# Patient Record
Sex: MALE | Race: WHITE | NOT HISPANIC OR LATINO | Employment: UNEMPLOYED | URBAN - METROPOLITAN AREA
[De-identification: names, ages, dates, MRNs, and addresses within clinical notes are randomized per-mention and may not be internally consistent; named-entity substitution may affect disease eponyms.]

---
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
## 2022-09-07 ENCOUNTER — TELEPHONE (OUTPATIENT)
Dept: INTERNAL MEDICINE | Facility: CLINIC | Age: 22
End: 2022-09-07

## 2022-09-07 NOTE — TELEPHONE ENCOUNTER
HUB- please advise that he needs to sign a HYUN form here or at the peds office. We cannot call and request records, a legal release of information form filled out by the patient is required

## 2022-09-07 NOTE — TELEPHONE ENCOUNTER
Caller: MIRANDADAIA    Relationship: Mother    Best call back number: [phone]    What is the best time to reach you: ANY    Who are you requesting to speak with (clinical staff, provider,  specific staff member): CLINICAL    What was the call regarding: PATIENT'S MOTHER CALLED AND STATED THAT SHE WAS TRYING TO OBTAIN PATIENT'S MEDICAL RECORDS FROM HIS PEDIATRIC OFFICE. SHE STATES THAT THEY TOLD HER THAT THE OFFICE COULD REQUEST THESE FROM THEM. THEIR PHONE NUMBER -[phone], AND THIS IS DR. SALLIE CALDWELL ON [facility].     PATIENT WILL BE A NEW PATIENT WITH NATALIA FITZGERALD ON 9/19.    Do you require a callback: YES    PLEASE ADVISE.

## 2022-09-19 ENCOUNTER — OFFICE VISIT (OUTPATIENT)
Dept: INTERNAL MEDICINE | Facility: CLINIC | Age: 22
End: 2022-09-19

## 2022-09-19 VITALS
OXYGEN SATURATION: 99 % | DIASTOLIC BLOOD PRESSURE: 80 MMHG | SYSTOLIC BLOOD PRESSURE: 120 MMHG | HEIGHT: 64 IN | HEART RATE: 81 BPM | TEMPERATURE: 98.9 F | BODY MASS INDEX: 17.86 KG/M2 | WEIGHT: 104.6 LBS

## 2022-09-19 DIAGNOSIS — Q11.1: Chronic | ICD-10-CM

## 2022-09-19 DIAGNOSIS — Q06.8 LOW LYING CONUS MEDULLARIS: Chronic | ICD-10-CM

## 2022-09-19 DIAGNOSIS — G80.1 SPASTIC DIPLEGIC CEREBRAL PALSY: Chronic | ICD-10-CM

## 2022-09-19 DIAGNOSIS — B35.1 ONYCHOMYCOSIS: ICD-10-CM

## 2022-09-19 DIAGNOSIS — Z11.59 SCREENING FOR VIRAL DISEASE: ICD-10-CM

## 2022-09-19 DIAGNOSIS — Z00.00 PHYSICAL EXAM: Primary | ICD-10-CM

## 2022-09-19 PROCEDURE — 99385 PREV VISIT NEW AGE 18-39: CPT | Performed by: NURSE PRACTITIONER

## 2022-09-20 LAB
ALBUMIN SERPL-MCNC: 5 G/DL (ref 4.1–5.2)
ALBUMIN/GLOB SERPL: 2.1 {RATIO} (ref 1.2–2.2)
ALP SERPL-CCNC: 100 IU/L (ref 51–125)
ALT SERPL-CCNC: 19 IU/L (ref 0–44)
AST SERPL-CCNC: 19 IU/L (ref 0–40)
BILIRUB SERPL-MCNC: 0.4 MG/DL (ref 0–1.2)
BUN SERPL-MCNC: 20 MG/DL (ref 6–20)
BUN/CREAT SERPL: 22 (ref 9–20)
CALCIUM SERPL-MCNC: 9.6 MG/DL (ref 8.7–10.2)
CHLORIDE SERPL-SCNC: 102 MMOL/L (ref 96–106)
CHOLEST SERPL-MCNC: 150 MG/DL (ref 100–199)
CO2 SERPL-SCNC: 24 MMOL/L (ref 20–29)
CREAT SERPL-MCNC: 0.9 MG/DL (ref 0.76–1.27)
EGFRCR SERPLBLD CKD-EPI 2021: 125 ML/MIN/1.73
ERYTHROCYTE [DISTWIDTH] IN BLOOD BY AUTOMATED COUNT: 12.6 % (ref 11.6–15.4)
GLOBULIN SER CALC-MCNC: 2.4 G/DL (ref 1.5–4.5)
GLUCOSE SERPL-MCNC: 93 MG/DL (ref 65–99)
GLUCOSE UR QL STRIP: NORMAL
HCT VFR BLD AUTO: 43.8 % (ref 37.5–51)
HDLC SERPL-MCNC: 44 MG/DL
HGB BLD-MCNC: 14.6 G/DL (ref 13–17.7)
KETONES UR QL STRIP: NORMAL
LDLC SERPL CALC-MCNC: 87 MG/DL (ref 0–99)
MCH RBC QN AUTO: 29.3 PG (ref 26.6–33)
MCHC RBC AUTO-ENTMCNC: 33.3 G/DL (ref 31.5–35.7)
MCV RBC AUTO: 88 FL (ref 79–97)
PH UR STRIP: NORMAL [PH]
PLATELET # BLD AUTO: 157 X10E3/UL (ref 150–450)
POTASSIUM SERPL-SCNC: 3.9 MMOL/L (ref 3.5–5.2)
PROT SERPL-MCNC: 7.4 G/DL (ref 6–8.5)
PROT UR QL STRIP: NORMAL
RBC # BLD AUTO: 4.99 X10E6/UL (ref 4.14–5.8)
SODIUM SERPL-SCNC: 141 MMOL/L (ref 134–144)
SP GR UR STRIP: NORMAL
SPECIMEN STATUS: NORMAL
TRIGL SERPL-MCNC: 102 MG/DL (ref 0–149)
TSH SERPL DL<=0.005 MIU/L-ACNC: 1.43 UIU/ML (ref 0.45–4.5)
UNABLE TO VOID: NORMAL
VLDLC SERPL CALC-MCNC: 19 MG/DL (ref 5–40)
WBC # BLD AUTO: 6.6 X10E3/UL (ref 3.4–10.8)

## 2022-09-21 ENCOUNTER — TELEPHONE (OUTPATIENT)
Dept: INTERNAL MEDICINE | Facility: CLINIC | Age: 22
End: 2022-09-21

## 2022-09-21 LAB
HCV AB S/CO SERPL IA: <0.1 S/CO RATIO (ref 0–0.9)
Lab: NORMAL
WRITTEN AUTHORIZATION: NORMAL

## 2022-09-21 NOTE — TELEPHONE ENCOUNTER
Caller: NATALIA JEAN    Relationship to patient: Mother    Best call back number:[phone]    Patient is needing: PATIENTS MOTHER CALLED LOOKING FOR AN UPDATE ABOUT THE MEDICATION FOR HER SONS TOENAILS THAT WAS SUPPOSED TO BE CALLED IN AFTER HIS APPOINTMENT ON 9/19/22.HUB WAS UNABLE TO FIND WHERE ANYTHING WAS CALLED IN.

## 2022-09-23 PROBLEM — Q06.8 LOW LYING CONUS MEDULLARIS: Status: ACTIVE | Noted: 2022-09-23

## 2022-09-23 PROBLEM — Q06.8 LOW LYING CONUS MEDULLARIS: Chronic | Status: ACTIVE | Noted: 2022-09-23

## 2022-09-23 PROBLEM — S59.221A: Status: ACTIVE | Noted: 2017-10-11

## 2022-09-23 PROBLEM — Q11.1: Status: ACTIVE | Noted: 2022-09-23

## 2022-09-23 PROBLEM — B35.1 ONYCHOMYCOSIS: Status: ACTIVE | Noted: 2022-09-23

## 2022-09-23 PROBLEM — Q11.1: Chronic | Status: ACTIVE | Noted: 2022-09-23

## 2022-09-23 NOTE — PROGRESS NOTES
Subjective   Kei Antonio is a 22 y.o. male who is here to establish care and for a physical exam.    History of Present Illness  Patient presents to establish care. Previous medical history discussed with patient in details and reflected in problem list.  He has worked at the [employer] for several years which he enjoys, currently lives at home with mother. He reports feeling well, denies any health problems.  He was diagnosed with CP at age 16, has received ortho evaluations and PT according to previous records.       The following portions of the patient's history were reviewed and updated as appropriate: allergies, current medications, past social history and problem list.    Past Medical History:   Diagnosis Date   • CP (cerebral palsy) (HCC)          Current Outpatient Medications:   •  Efinaconazole 10 % solution, Apply 1 application topically Every Night., Disp: 8 mL, Rfl: 2    No Known Allergies    Review of Systems   Constitutional: Negative for activity change, appetite change, chills, diaphoresis, fatigue, fever and unexpected weight change.   HENT: Positive for congestion and postnasal drip. Negative for dental problem, drooling, ear discharge, ear pain, facial swelling, hearing loss, mouth sores, nosebleeds, rhinorrhea, sinus pressure, sore throat, tinnitus and trouble swallowing.    Eyes: Positive for visual disturbance. Negative for photophobia, pain, discharge, redness and itching.   Respiratory: Negative for apnea, cough, choking, chest tightness, shortness of breath and wheezing.    Cardiovascular: Negative for chest pain, palpitations and leg swelling.        No orthopnea, PND, SHETH   Gastrointestinal: Negative for abdominal pain, blood in stool, constipation, diarrhea, nausea and vomiting.   Endocrine: Negative for cold intolerance, heat intolerance, polydipsia and polyuria.   Genitourinary: Negative for decreased urine volume, dysuria, enuresis, flank pain, frequency, hematuria and urgency.  "  Musculoskeletal: Negative for arthralgias, back pain, gait problem, joint swelling, myalgias, neck pain and neck stiffness.   Skin: Negative for color change and rash.        No hair changes, no nail changes   Allergic/Immunologic: Negative for environmental allergies, food allergies and immunocompromised state.   Neurological: Negative for dizziness, tremors, seizures, syncope, speech difficulty, weakness, light-headedness, numbness and headaches.   Hematological: Negative for adenopathy. Does not bruise/bleed easily.   Psychiatric/Behavioral: Negative for agitation, confusion, decreased concentration, dysphoric mood, sleep disturbance and suicidal ideas. The patient is not nervous/anxious.        Objective   Vitals:    09/19/22 1512   BP: 120/80   BP Location: Left arm   Patient Position: Sitting   Cuff Size: Adult   Pulse: 81   Temp: 98.9 °F (37.2 °C)   TempSrc: Temporal   SpO2: 99%   Weight: 47.4 kg (104 lb 9.6 oz)   Height: 162.6 cm (64\")     Physical Exam  Constitutional:       General: He is not in acute distress.     Appearance: Normal appearance. He is not diaphoretic.   HENT:      Head: Normocephalic and atraumatic.      Right Ear: Tympanic membrane, ear canal and external ear normal.      Left Ear: Tympanic membrane, ear canal and external ear normal.      Nose: Nose normal. No rhinorrhea.      Mouth/Throat:      Mouth: Mucous membranes are moist.      Pharynx: Oropharynx is clear.   Eyes:      General:         Right eye: No discharge.         Left eye: No discharge.      Comments: Right eye prosthesis   Cardiovascular:      Rate and Rhythm: Normal rate and regular rhythm.      Pulses: Normal pulses.      Heart sounds: Normal heart sounds.   Pulmonary:      Effort: Pulmonary effort is normal.      Breath sounds: Normal breath sounds.   Abdominal:      General: Bowel sounds are normal.      Tenderness: There is no abdominal tenderness.   Musculoskeletal:         General: No swelling or tenderness.      " Cervical back: Normal range of motion.   Skin:     General: Skin is warm and dry.      Comments: Thickening and discoloration of toenails bilaterally   Neurological:      General: No focal deficit present.      Mental Status: He is alert and oriented to person, place, and time.      Comments: Walking on toes  Generalized spasticity   Psychiatric:         Mood and Affect: Mood normal.         Behavior: Behavior normal.         Judgment: Judgment normal.         Assessment & Plan   Diagnoses and all orders for this visit:    1. Physical exam (Primary)  -     CBC (No Diff)  -     Comprehensive Metabolic Panel  -     Lipid Panel  -     TSH  -     Urinalysis With Microscopic If Indicated (No Culture) - Urine, Clean Catch    2. Spastic diplegic cerebral palsy (HCC)  Assessment & Plan:  Diagnosed at age 16      3. Low lying conus medullaris (HCC)  Assessment & Plan:  Noted on MRI of lumbar spine in 2016; neurosurgery evaluation 11/23/16 with PT referral      4. Congenital absence of right eye  Assessment & Plan:  Prothesis in place      5. Onychomycosis  Assessment & Plan:  There is thickening and discoloration of bilateral toenails (probably due in part to walking on toes to some degree), will treat and continue to monitor.    Orders:  -     Efinaconazole 10 % solution; Apply 1 application topically Every Night.  Dispense: 8 mL; Refill: 2    6. Screening for viral disease  -     Cancel: Hepatitis C Antibody    Other orders  -     Unable To Void  -     Specimen Status Report  -     Hepatitis C Antibody  -     Please Note  -     Written Authorization      Risk assessment:  He lives at home with his mother, works at the [employer].  His Body mass index is 17.95 kg/m². - He tries to follow a low-fat, low-cholesterol diet.    Prevention:  Health Maintenance   Topic Date Due   • ANNUAL PHYSICAL  Never done   • HPV VACCINES (1 - Male 2-dose series) Never done   • TDAP/TD VACCINES (1 - Tdap) Never done   • COVID-19 Vaccine (4 -  Booster for Moderna series) 01/24/2022   • INFLUENZA VACCINE  10/01/2022   • HEPATITIS C SCREENING  Completed   • Pneumococcal Vaccine 0-64  Aged Out   • MENINGOCOCCAL VACCINE  Aged Out       Discussed healthy lifestyle choices such as maintaining a balanced diet low in carbohydrates and limiting caffeine and alcohol intake.  Recommended routine exercise for bone strength and cardiovascular health.    Will ask to sign record release so we can obtain and review records from his pediatrician.

## 2022-09-23 NOTE — TELEPHONE ENCOUNTER
I have tried to send in a topical nail polish for his nail fungus but there is not a pharmacy in his chart, will you check with his mother and I will send to pharmacy? Thanks.

## 2022-09-23 NOTE — ASSESSMENT & PLAN NOTE
There is thickening and discoloration of bilateral toenails (probably due in part to walking on toes to some degree), will treat and continue to monitor.

## 2022-10-30 PROBLEM — M30.3 KAWASAKI DISEASE: Status: ACTIVE | Noted: 2022-10-30

## 2023-09-21 ENCOUNTER — OFFICE VISIT (OUTPATIENT)
Dept: INTERNAL MEDICINE | Facility: CLINIC | Age: 23
End: 2023-09-21
Payer: COMMERCIAL

## 2023-09-21 VITALS
HEIGHT: 64 IN | WEIGHT: 100.2 LBS | OXYGEN SATURATION: 100 % | BODY MASS INDEX: 17.11 KG/M2 | DIASTOLIC BLOOD PRESSURE: 60 MMHG | SYSTOLIC BLOOD PRESSURE: 104 MMHG | HEART RATE: 81 BPM

## 2023-09-21 DIAGNOSIS — G80.1 SPASTIC DIPLEGIC CEREBRAL PALSY: Chronic | ICD-10-CM

## 2023-09-21 DIAGNOSIS — Z00.00 PE (PHYSICAL EXAM), ANNUAL: Primary | ICD-10-CM

## 2023-09-21 DIAGNOSIS — B35.1 ONYCHOMYCOSIS: ICD-10-CM

## 2023-09-21 DIAGNOSIS — Q11.1: Chronic | ICD-10-CM

## 2023-09-21 PROCEDURE — 99395 PREV VISIT EST AGE 18-39: CPT | Performed by: NURSE PRACTITIONER

## 2023-09-21 NOTE — PROGRESS NOTES
Subjective   Kei Antonio is a 23 y.o. male who is here for a physical exam.    History of Present Illness  He presents with his mother for follow-up, was unable to receive antifungal prescribed last year for nails. He continues to have discoloration of nails.  He has remained healthy, continues to work at the [employer] which he enjoys.     The following portions of the patient's history were reviewed and updated as appropriate: allergies, current medications, past social history and problem list.    Past Medical History:   Diagnosis Date    CP (cerebral palsy)          Current Outpatient Medications:     Efinaconazole 10 % solution, Apply 1 application  topically Every Night., Disp: 8 mL, Rfl: 2    Allergies   Allergen Reactions    Sulfa Antibiotics Rash     As a child - 7 or 7 y/[age]       Review of Systems   Constitutional:  Negative for activity change, appetite change, chills, diaphoresis, fatigue, fever and unexpected weight change.   HENT:  Positive for congestion, postnasal drip and rhinorrhea. Negative for dental problem, drooling, ear discharge, ear pain, facial swelling, hearing loss, mouth sores, nosebleeds, sinus pressure, sore throat, tinnitus and trouble swallowing.    Eyes:  Negative for photophobia, pain, discharge, redness, itching and visual disturbance.   Respiratory:  Negative for apnea, cough, choking, chest tightness, shortness of breath and wheezing.    Cardiovascular:  Negative for chest pain, palpitations and leg swelling.        No orthopnea, PND, SHETH   Gastrointestinal:  Negative for abdominal pain, blood in stool, constipation, diarrhea, nausea and vomiting.   Endocrine: Negative for cold intolerance, heat intolerance, polydipsia and polyuria.   Genitourinary:  Negative for decreased urine volume, dysuria, enuresis, flank pain, frequency, hematuria and urgency.   Musculoskeletal:  Negative for arthralgias, back pain, gait problem, joint swelling, myalgias, neck pain and neck stiffness.   Skin:   "Negative for color change and rash.        Thickening of nails   Allergic/Immunologic: Negative for environmental allergies, food allergies and immunocompromised state.   Neurological:  Negative for dizziness, tremors, seizures, syncope, speech difficulty, weakness, light-headedness, numbness and headaches.   Hematological:  Negative for adenopathy. Does not bruise/bleed easily.   Psychiatric/Behavioral:  Negative for agitation, confusion, decreased concentration, dysphoric mood, sleep disturbance and suicidal ideas. The patient is not nervous/anxious.      Objective   Vitals:    09/21/23 0848   BP: 104/60   BP Location: Left arm   Patient Position: Sitting   Cuff Size: Adult   Pulse: 81   SpO2: 100%   Weight: 45.5 kg (100 lb 3.2 oz)   Height: 162.6 cm (64\")     Physical Exam  Constitutional:       General: He is not in acute distress.     Appearance: Normal appearance. He is not diaphoretic.   HENT:      Head: Normocephalic and atraumatic.      Right Ear: Tympanic membrane, ear canal and external ear normal.      Left Ear: Tympanic membrane, ear canal and external ear normal.      Nose: Nose normal. No rhinorrhea.      Mouth/Throat:      Mouth: Mucous membranes are moist.      Pharynx: Oropharynx is clear.   Eyes:      General:         Right eye: No discharge.         Left eye: No discharge.      Conjunctiva/sclera: Conjunctivae normal.      Comments: Artifical right eye   Cardiovascular:      Rate and Rhythm: Normal rate and regular rhythm.      Pulses: Normal pulses.      Heart sounds: Normal heart sounds.    with a grade of 2/6.   Pulmonary:      Effort: Pulmonary effort is normal.      Breath sounds: Normal breath sounds.   Abdominal:      General: Bowel sounds are normal.      Tenderness: There is no abdominal tenderness.   Musculoskeletal:         General: No swelling or tenderness.      Cervical back: Normal range of motion.   Feet:      Right foot:      Toenail Condition: Fungal disease present.     Left " foot:      Toenail Condition: Fungal disease present.     Comments: Thickening of great toenails bilaterally  Skin:     General: Skin is warm and dry.   Neurological:      General: No focal deficit present.      Mental Status: He is alert and oriented to person, place, and time.   Psychiatric:         Mood and Affect: Mood normal.         Behavior: Behavior normal.         Judgment: Judgment normal.     BMI is below normal parameters (malnutrition). Recommendations: reports a good appetite.      Assessment & Plan   Diagnoses and all orders for this visit:    1. PE (physical exam), annual (Primary)  -     Cancel: CBC (No Diff)  -     Cancel: Comprehensive Metabolic Panel  -     Cancel: Lipid Panel  -     Cancel: TSH  -     Cancel: Urinalysis With Microscopic If Indicated (No Culture) - Urine, Clean Catch    2. Spastic diplegic cerebral palsy    3. Congenital absence of right eye    4. Onychomycosis  Assessment & Plan:  Will treat with topical antifungal and continue to monitor    Orders:  -     Efinaconazole 10 % solution; Apply 1 application  topically Every Night.  Dispense: 8 mL; Refill: 2        Risk assessment:  He lives at home with his mother and works at the [employer], has remained health this year.  His Body mass index is 17.2 kg/m². - He has a good appetite and remains active.    Prevention:  Health Maintenance   Topic Date Due    BMI FOLLOWUP  Never done    COVID-19 Vaccine (4 - Moderna series) 01/24/2022    ANNUAL PHYSICAL  09/19/2023    INFLUENZA VACCINE  10/01/2023    TDAP/TD VACCINES (2 - Td or Tdap) 10/02/2023    HEPATITIS C SCREENING  Completed    Pneumococcal Vaccine 0-64  Aged Out    MENINGOCOCCAL VACCINE  Aged Out    HPV VACCINES  Discontinued   He will receive his flu shot at his employer, [employer], next week.    Discussed healthy lifestyle choices such as maintaining a balanced diet low in carbohydrates and limiting caffeine and alcohol intake.  Recommended routine exercise for bone strength and  cardiovascular health.

## 2024-04-24 ENCOUNTER — TELEPHONE (OUTPATIENT)
Dept: INTERNAL MEDICINE | Facility: CLINIC | Age: 24
End: 2024-04-24
Payer: COMMERCIAL

## 2024-04-24 NOTE — TELEPHONE ENCOUNTER
Caller: NATALIA JEAN    Relationship: Mother    Best call back number: [phone]     What was the call regarding: MOM IS CALLING TO CHECK STATUS AND MAKE SURE FAX WAS RECEIVED. PLEASE CALL.

## 2024-04-24 NOTE — TELEPHONE ENCOUNTER
Patient's mom wrote in under her chart, advised her that we have not received fax yet.    HUB TO READ  The way our faxes are processed we usually don't receive them until the next day. I will call and let you know if we have not received it tomorrow.

## 2024-04-24 NOTE — TELEPHONE ENCOUNTER
Hub staff attempted to follow warm transfer process and was unsuccessful     Caller: NATALIA JEAN    Relationship to patient: Mother    Best call back number:     NATALIA JEAN (Mother) [phone] (Mobile)       Patient is needing: FAXING OVER INFORMATION TO BE FILLED OUT REGARDING HIS INSURANCE

## 2024-05-03 ENCOUNTER — TELEPHONE (OUTPATIENT)
Dept: INTERNAL MEDICINE | Facility: CLINIC | Age: 24
End: 2024-05-03
Payer: COMMERCIAL